# Patient Record
Sex: MALE | Race: WHITE | URBAN - METROPOLITAN AREA
[De-identification: names, ages, dates, MRNs, and addresses within clinical notes are randomized per-mention and may not be internally consistent; named-entity substitution may affect disease eponyms.]

---

## 2017-03-16 ENCOUNTER — TRANSFERRED RECORDS (OUTPATIENT)
Dept: HEALTH INFORMATION MANAGEMENT | Facility: CLINIC | Age: 27
End: 2017-03-16

## 2017-03-30 ENCOUNTER — OFFICE VISIT (OUTPATIENT)
Dept: SURGERY | Facility: CLINIC | Age: 27
End: 2017-03-30
Payer: COMMERCIAL

## 2017-03-30 VITALS
SYSTOLIC BLOOD PRESSURE: 120 MMHG | HEIGHT: 72 IN | DIASTOLIC BLOOD PRESSURE: 70 MMHG | WEIGHT: 160 LBS | BODY MASS INDEX: 21.67 KG/M2 | HEART RATE: 103 BPM

## 2017-03-30 DIAGNOSIS — R10.32 LEFT GROIN PAIN: Primary | ICD-10-CM

## 2017-03-30 PROCEDURE — 99243 OFF/OP CNSLTJ NEW/EST LOW 30: CPT | Performed by: SURGERY

## 2017-03-30 NOTE — PROGRESS NOTES
[facility]  Surgery Consultation    CONSULTATION REQUESTED BY:  Cj Moore MD    HPI: Patient is a 26-year-old  who is referred by sports medicine for consultation of possible sports hernia.  He states that his symptoms began in November of last year.  He is an avid runner and states that he was running down a hill on a trail approaching a curve where there was another person in the trail and he had to veer off to avoid said person and this caused Some degree of immediate discomfort within his left groin area.  He states it felt like strain.  He took time off and states that during the time of rest his pain subsided.  He returned to exercise and states that his pain returned with aggressive and strenuous activity.  He then took another two months off and states that his symptoms were relatively stable.  He then describes an episode proximally two weeks ago where he was sitting in a chair and class he leaned back and stretched shifting in his chair and felt immediate severe increase in pain within his left groin.  He then went to urgent care and was sent to the emergency department.  He states that he underwent a CT scan of the abdomen and pelvis which was reportedly negative.  He describes this pain as being nine or 10 on a 10 scale.  He notes some radiation to the left hip and back.  He feels tension and pulling within the left groin.  Since then he has had persistent pain at rest as well as some  Pain that he interprets as being in the left testicle.  He also describes some foot swelling.  He's had no identifiable bulge.  He has been somewhat obstipated.    PMH:   has no past medical history on file.  PSH:    has no past surgical history on file.  Social History:   reports that he has never smoked. He has never used smokeless tobacco. He reports that he does not drink alcohol or use illicit drugs.  Family History:  family history includes Breast Cancer in his paternal  grandmother; DIABETES in his maternal grandfather; Gallbladder Disease in his mother; Hyperlipidemia in his father and paternal grandfather; Hypertension in his maternal grandmother; Other Cancer in his father and maternal grandfather; Thyroid Disease in his mother. There is no history of Asthma, C.A.D., CEREBROVASCULAR DISEASE, Cancer - colorectal, or Prostate Cancer.  Medications/Allergies: Home medications and allergies reviewed.    ROS:  The 10 point Review of Systems is negative other than noted in the HPI.    Physical Exam:  /70  Pulse 103  Ht 6' (1.829 m)  Wt 160 lb (72.6 kg)  BMI 21.7 kg/m2  GENERAL: Generally appears well.  Psych: Alert and Oriented.  Normal affect  Eyes: Sclera clear  Respiratory:  Lungs clear to ausculation bilaterally with good air excursion  Cardiovascular:  Regular Rate and Rhythm with no murmurs gallops or rubs, normal peripheral pulses  GI: Abdomen Non Distended Non-Tender  No hernias palpated..  Groin- I examined the patient in both the standing and supine positions. Right Groin- No hernia Palpated.  No tenderness on palpation. Left Groin- No hernia Palpated.  No tenderness on palpation. No scrotal or testicle abnormalities. Area of greatest discomfort appears to be at the pubic tubercle just inferior to this and associated with what appears to be the adductor  tendon  Lymphatic/Hematologic/Immune:  No femoral or cervical lymphadenopathy.  Integumentary:  No rashes  Neurological: grossly intact     All new lab and imaging data was reviewed.     Impression and Plan:  Patient is a 26 year old male with Left groin pain suggesting acute left adductor strain, seems somewhat strange as this occurred with minimal activity. Has no identifiable inguinal pain no identifiable inguinal hernia    PLAN: Discussed in detail with him the location and identification of sports hernias.  At present there does not appear to be anything to suggest that this represents a sports hernia.  Due to  the location of his pain and going to refer him to orthopedics for evaluation of left adductor strain.  This was discussed with him in detail.  His questions were answered.  At present he may follow up with me on an as-needed basis.    Thank you very much for this consult.    Collin López M.D.  [facility]  [phone]    Please route or send letter to:  Primary Care Provider (PCP) and Referring Provider

## 2017-03-30 NOTE — LETTER
2017    [facility]  Surgery Consultation    RE:  Femi Gainesug-:  [birth year]     CONSULTATION REQUESTED BY: Cj Moore MD     HPI: Patient is a 26-year-old  who is referred by sports medicine for consultation of possible sports hernia. He states that his symptoms began in November of last year. He is an avid runner and states that he was running down a hill on a trail approaching a curve where there was another person in the trail and he had to veer off to avoid said person and this caused Some degree of immediate discomfort within his left groin area. He states it felt like strain. He took time off and states that during the time of rest his pain subsided. He returned to exercise and states that his pain returned with aggressive and strenuous activity. He then took another two months off and states that his symptoms were relatively stable. He then describes an episode proximally two weeks ago where he was sitting in a chair and class he leaned back and stretched shifting in his chair and felt immediate severe increase in pain within his left groin. He then went to urgent care and was sent to the emergency department. He states that he underwent a CT scan of the abdomen and pelvis which was reportedly negative. He describes this pain as being nine or 10 on a 10 scale. He notes some radiation to the left hip and back. He feels tension and pulling within the left groin. Since then he has had persistent pain at rest as well as some Pain that he interprets as being in the left testicle. He also describes some foot swelling. He's had no identifiable bulge. He has been somewhat obstipated.     PMH:  has no past medical history on file.  PSH:  has no past surgical history on file.  Social History:  reports that he has never smoked. He has never used smokeless tobacco. He reports that he does not drink alcohol or use illicit drugs.  Family History: family history includes  Breast Cancer in his paternal grandmother; DIABETES in his maternal grandfather; Gallbladder Disease in his mother; Hyperlipidemia in his father and paternal grandfather; Hypertension in his maternal grandmother; Other Cancer in his father and maternal grandfather; Thyroid Disease in his mother. There is no history of Asthma, C.A.D., CEREBROVASCULAR DISEASE, Cancer - colorectal, or Prostate Cancer.  Medications/Allergies: Home medications and allergies reviewed.     ROS: The 10 point Review of Systems is negative other than noted in the HPI.     Physical Exam:  /70  Pulse 103  Ht 6' (1.829 m)  Wt 160 lb (72.6 kg)  BMI 21.7 kg/m2  GENERAL: Generally appears well.  Psych: Alert and Oriented. Normal affect  Eyes: Sclera clear  Respiratory: Lungs clear to ausculation bilaterally with good air excursion  Cardiovascular: Regular Rate and Rhythm with no murmurs gallops or rubs, normal peripheral pulses  GI: Abdomen Non Distended Non-Tender No hernias palpated..  Groin- I examined the patient in both the standing and supine positions. Right Groin- No hernia Palpated. No tenderness on palpation. Left Groin- No hernia Palpated. No tenderness on palpation. No scrotal or testicle abnormalities. Area of greatest discomfort appears to be at the pubic tubercle just inferior to this and associated with what appears to be the adductor tendon  Lymphatic/Hematologic/Immune: No femoral or cervical lymphadenopathy.  Integumentary: No rashes  Neurological: grossly intact      All new lab and imaging data was reviewed.      Impression and Plan:  Patient is a 26 year old male with Left groin pain suggesting acute left adductor strain, seems somewhat strange as this occurred with minimal activity. Has no identifiable inguinal pain no identifiable inguinal hernia     PLAN: Discussed in detail with him the location and identification of sports hernias. At present there does not appear to be anything to suggest that this represents a  sports hernia. Due to the location of his pain and going to refer him to orthopedics for evaluation of left adductor strain. This was discussed with him in detail. His questions were answered. At present he may follow up with me on an as-needed basis.     Thank you very much for this consult.     Collin López M.D.  [facility]  [phone]

## 2017-03-30 NOTE — MR AVS SNAPSHOT
"              After Visit Summary   3/30/2017    Femi Mullen    MRN: [id number]           Patient Information     Date Of Birth          1990        Visit Information        Provider Department      3/30/2017 9:15 AM Collin López MD [facility]      Today's Diagnoses     Left groin pain    -  1       Follow-ups after your visit        Who to contact     If you have questions or need follow up information about today's clinic visit or your schedule please contact [facility] directly at [phone].  Normal or non-critical lab and imaging results will be communicated to you by [employer], letter or phone within 4 business days after the clinic has received the results. If you do not hear from us within 7 days, please contact the clinic through Int[employer] or phone. If you have a critical or abnormal lab result, we will notify you by phone as soon as possible.  Submit refill requests through [email or web address] or call your pharmacy and they will forward the refill request to us. Please allow 3 business days for your refill to be completed.          Additional Information About Your Visit        MyChart Information     [email or web address] lets you send messages to your doctor, view your test results, renew your prescriptions, schedule appointments and more. To sign up, go to [email or web address] . Click on \"Log in\" on the left side of the screen, which will take you to the Welcome page. Then click on \"Sign up Now\" on the right side of the page.     You will be asked to enter the access code listed below, as well as some personal information. Please follow the directions to create your username and password.     Your access code is: [email or web address]  Expires: 2017  9:47 AM     Your access code will  in 90 days. If you need help or a new code, please call your [place] clinic or [phone].        Care EveryWhere ID     This is your Care " EveryWhere ID. This could be used by other organizations to access your [facility] medical records  [id number]        Your Vitals Were     Pulse Height BMI (Body Mass Index)             103 6' (1.829 m) 21.7 kg/m2          Blood Pressure from Last 3 Encounters:   03/30/17 120/70   11/25/14 162/84   02/07/14 132/76    Weight from Last 3 Encounters:   03/30/17 160 lb (72.6 kg)   11/25/14 164 lb (74.4 kg)   02/07/14 154 lb (69.9 kg)              Today, you had the following     No orders found for display       Primary Care Provider    Physician No Ref-Primary       No address on file        Thank you!     Thank you for choosing [facility]  for your care. Our goal is always to provide you with excellent care. Hearing back from our patients is one way we can continue to improve our services. Please take a few minutes to complete the written survey that you may receive in the mail after your visit with us. Thank you!             Your Updated Medication List - Protect others around you: Learn how to safely use, store and throw away your medicines at www.disposemymeds.org.          This list is accurate as of: 3/30/17 10:03 AM.  Always use your most recent med list.                   Brand Name Dispense Instructions for use    CYCLOBENZAPRINE HCL PO      Reported on 3/30/2017